# Patient Record
Sex: FEMALE | Race: WHITE | NOT HISPANIC OR LATINO
[De-identification: names, ages, dates, MRNs, and addresses within clinical notes are randomized per-mention and may not be internally consistent; named-entity substitution may affect disease eponyms.]

---

## 2018-02-12 ENCOUNTER — TRANSCRIPTION ENCOUNTER (OUTPATIENT)
Age: 33
End: 2018-02-12

## 2019-11-12 PROBLEM — Z00.00 ENCOUNTER FOR PREVENTIVE HEALTH EXAMINATION: Status: ACTIVE | Noted: 2019-11-12

## 2019-11-13 ENCOUNTER — APPOINTMENT (OUTPATIENT)
Dept: BREAST CENTER | Facility: CLINIC | Age: 34
End: 2019-11-13
Payer: COMMERCIAL

## 2019-11-13 VITALS
DIASTOLIC BLOOD PRESSURE: 67 MMHG | HEART RATE: 69 BPM | HEIGHT: 66 IN | BODY MASS INDEX: 21.38 KG/M2 | WEIGHT: 133 LBS | SYSTOLIC BLOOD PRESSURE: 122 MMHG

## 2019-11-13 DIAGNOSIS — Z82.3 FAMILY HISTORY OF STROKE: ICD-10-CM

## 2019-11-13 DIAGNOSIS — Z82.0 FAMILY HISTORY OF EPILEPSY AND OTHER DISEASES OF THE NERVOUS SYSTEM: ICD-10-CM

## 2019-11-13 DIAGNOSIS — Z78.9 OTHER SPECIFIED HEALTH STATUS: ICD-10-CM

## 2019-11-13 DIAGNOSIS — N63.20 UNSPECIFIED LUMP IN THE LEFT BREAST, UNSPECIFIED QUADRANT: ICD-10-CM

## 2019-11-13 DIAGNOSIS — Z82.49 FAMILY HISTORY OF ISCHEMIC HEART DISEASE AND OTHER DISEASES OF THE CIRCULATORY SYSTEM: ICD-10-CM

## 2019-11-13 PROCEDURE — 99244 OFF/OP CNSLTJ NEW/EST MOD 40: CPT

## 2019-11-24 NOTE — ASSESSMENT
[FreeTextEntry1] : plan bilateral mg/sono now\par plan MRI JAN 2020\par reviewed her risk status, she is high risk\par We reviewed risk reduction strategies including maintaining a BMI <25, limiting red meat intake and alcoholic beverages to 3 per week and exercise (150 min/ week low intensity or 75 min/week high intensity). And maintaining a normal vitamin D level.\par \par f/u with me 6 months\par She knows to call or return sooner should any concerns or questions arise.\par

## 2019-11-24 NOTE — REVIEW OF SYSTEMS
[Joint Swelling] : joint swelling [Joint Pain] : joint pain [Joint Stiffness] : joint stiffness [Negative] : Heme/Lymph [Feeling Tired] : feeling tired [de-identified] : headaches

## 2019-11-24 NOTE — PHYSICAL EXAM
[Atraumatic] : atraumatic [Normocephalic] : normocephalic [Supple] : supple [No Supraclavicular Adenopathy] : no supraclavicular adenopathy [Examined in the supine and seated position] : examined in the supine and seated position [No dominant masses] : no dominant masses in right breast  [No Nipple Retraction] : no left nipple retraction [No Nipple Discharge] : no left nipple discharge [No Axillary Lymphadenopathy] : no left axillary lymphadenopathy [No Edema] : no edema [No Rashes] : no rashes [No Ulceration] : no ulceration [de-identified] : LIQ medial there is a 2-3cm area of firm hardness c/w patient's area of concern

## 2019-11-24 NOTE — CONSULT LETTER
[Dear  ___] : Dear  [unfilled], [( Thank you for referring [unfilled] for consultation for _____ )] : Thank you for referring [unfilled] for consultation for [unfilled] [Please see my note below.] : Please see my note below. [Consult Closing:] : Thank you very much for allowing me to participate in the care of this patient.  If you have any questions, please do not hesitate to contact me. [Sincerely,] : Sincerely, [FreeTextEntry3] : Bozena Nava MS DO\par Breast Surgeon\par [facility] \[place] [address]\par  [DrNat  ___] : Dr. BREWSTER

## 2019-11-24 NOTE — HISTORY OF PRESENT ILLNESS
[FreeTextEntry1] : This is a 35 yo female referred by Dr. Williamson for family history of breast cancer. The patient's mother was diagnosed at age 29.  She has no previous breast history. \par She is overdue for imaging. She feels a left breast lump for the past few weeks. She finished nursing a few weeks ago as well.\par \par She does SBE. \par She has not noticed a change in her breast or a breast lump.\par She has not noticed a change in her nipple or nipple area.\par She has not noticed a change in the skin of the breast.\par She is not experiencing nipple discharge.\par She is not experiencing breast pain on left, on the right she had transient pain 1.5 months ago.\par She has not noticed a lump or lymph node under the armpit. \par \par BREAST CANCER RISK FACTORS\par Menarche: 14\par Date of LMP: 7/28/2018. 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                                 Images reviewed.\par Results: Birads 2\par No evidence of malignancy \par \par Last ultrasound:     01/25/2014                              Location: [facility] \par Report reviewed.                                 Images reviewed. \par Results: Birads 2\par No evidence of malignancy \par \par Last MRI:  06/11/2012                                           Location: [facility] \par Report reviewed.                                 Images reviewed. \par Results: Birads 1\par Negative

## 2020-04-07 ENCOUNTER — APPOINTMENT (OUTPATIENT)
Dept: BREAST CENTER | Facility: CLINIC | Age: 35
End: 2020-04-07
Payer: COMMERCIAL

## 2020-04-07 VITALS
DIASTOLIC BLOOD PRESSURE: 79 MMHG | HEIGHT: 66 IN | SYSTOLIC BLOOD PRESSURE: 126 MMHG | WEIGHT: 133 LBS | HEART RATE: 81 BPM | BODY MASS INDEX: 21.38 KG/M2

## 2020-04-07 DIAGNOSIS — Z12.31 ENCOUNTER FOR SCREENING MAMMOGRAM FOR MALIGNANT NEOPLASM OF BREAST: ICD-10-CM

## 2020-04-07 DIAGNOSIS — R92.2 INCONCLUSIVE MAMMOGRAM: ICD-10-CM

## 2020-04-07 PROCEDURE — 99215 OFFICE O/P EST HI 40 MIN: CPT

## 2020-04-07 NOTE — PHYSICAL EXAM
[Normocephalic] : normocephalic [Atraumatic] : atraumatic [Supple] : supple [No Supraclavicular Adenopathy] : no supraclavicular adenopathy [Examined in the supine and seated position] : examined in the supine and seated position [No Nipple Retraction] : no left nipple retraction [No Nipple Discharge] : no left nipple discharge [No Axillary Lymphadenopathy] : no left axillary lymphadenopathy [No Edema] : no edema [No Rashes] : no rashes [No Ulceration] : no ulceration [Symmetrical] : symmetrical [No dominant masses] : no dominant masses left breast [de-identified] : in area of patient's concern there is a faintly palpable mass in area of known complicated cyst.

## 2020-04-07 NOTE — ASSESSMENT
[FreeTextEntry1] : 34 yo female with high risk\par plan right us asap, if cyst then no mammogram\par plan bilateral mg/sono NOV 2020\par plan MRI MAR 2021\par reviewed her risk status, she is high risk\par We reviewed risk reduction strategies including maintaining a BMI <25, limiting red meat intake and alcoholic beverages to 3 per week and exercise (150 min/ week low intensity or 75 min/week high intensity). And maintaining a normal vitamin D level.\par \par f/u with me 6 months\par She knows to call or return sooner should any concerns or questions arise.\par  swelling, facial

## 2020-04-07 NOTE — CONSULT LETTER
[Dear  ___] : Dear  [unfilled], [Please see my note below.] : Please see my note below. [Consult Closing:] : Thank you very much for allowing me to participate in the care of this patient.  If you have any questions, please do not hesitate to contact me. [Sincerely,] : Sincerely, [DrNat  ___] : Dr. BREWSTER [Consult Letter:] : I had the pleasure of evaluating your patient, [unfilled]. [FreeTextEntry1] : We will get a right breast ultrasound asap. I expect the area to correlate with her known cyst. [FreeTextEntry3] : Bozena Nava MS DO\par Breast Surgeon\par [facility] \[place] [address]\par

## 2020-04-07 NOTE — REVIEW OF SYSTEMS
[Breast Lump] : breast lump [Negative] : Neurological General/healthful diet/Recommend to continue DASH diet; Defer fluid restriction to primary team.

## 2020-04-07 NOTE — HISTORY OF PRESENT ILLNESS
[FreeTextEntry1] : This is a 34 yo female referred by Dr. Williamson for family history of breast cancer. The patient's mother was diagnosed at age 29.  She has no previous breast history. \par She noticed a right breast mass since 4/4/2020.\par \par She does SBE. \par She has not noticed a change in her breast or a breast lump on left. On right mass, nontender, not enlarging\par She has not noticed a change in her nipple or nipple area.\par She has not noticed a change in the skin of the breast.\par She is not experiencing nipple discharge.\par She is not experiencing breast pain on left.\par She has not noticed a lump or lymph node under the armpit. \par \par BREAST CANCER RISK FACTORS\par Menarche: 14\par Date of LMP: 3/22/2020\par Menopause: pre \par Grav:  4    Para: 3 (4.5, 2.5, 11 months)\par Age at first live birth: 30\par Nursed: Yes all 3 children\par Hysterectomy: No \par Oophorectomy: No \par OCP: Yes in the past for 6 years \par HRT: No \par Last pap/pelvic exam: 6/2019 WNL \par Related family history: mother BCA @ 29\par Ashkenazi: No \par Mastery risk assessment: BRCAPRO 13.7%, TCv6 25.4%,  TCv7 31.1%, Juma 20.4%, Sariah is not applicable.\par BRCA testing: No (mother tested, negative) \par Bra size: 32B\par \par Last mammogram:   11/21/2019            Location: [place]\par Report reviewed.                                 Images reviewed.\par Results: Birads 3\par Extremely dense breast tissue with no mammographic evidence of malignancy and\par no interval change mammographically of an adverse nature. A follow-up\par mammogram is recommended in one year. \par \par Last ultrasound: 11/21/2019                   Location: [place] \par Report reviewed.                                 Images reviewed. \par Results: Birads 3\par At 8:00 in the right breast 2 to 4 cm from the nipple there is a probable\par complicated cyst. Six-month follow-up targeted right breast ultrasound is\par recommended to ensure stability or resolution.\par \par Last MRI:  1/9/2020                               Location: [place]  \par Report reviewed.                                 Images reviewed. \par Results: Birads 1\par No suspicious MRI findings. The patient is due for targeted\par ultrasound of the right breast for short interval follow-up of a probably\par benign finding in May 2020.

## 2020-04-14 ENCOUNTER — APPOINTMENT (OUTPATIENT)
Dept: BREAST CENTER | Facility: CLINIC | Age: 35
End: 2020-04-14
Payer: COMMERCIAL

## 2020-04-14 VITALS
HEART RATE: 66 BPM | HEIGHT: 66 IN | DIASTOLIC BLOOD PRESSURE: 75 MMHG | BODY MASS INDEX: 21.38 KG/M2 | WEIGHT: 133 LBS | SYSTOLIC BLOOD PRESSURE: 119 MMHG

## 2020-04-14 DIAGNOSIS — Z80.3 FAMILY HISTORY OF MALIGNANT NEOPLASM OF BREAST: ICD-10-CM

## 2020-04-14 DIAGNOSIS — Z78.9 OTHER SPECIFIED HEALTH STATUS: ICD-10-CM

## 2020-04-14 DIAGNOSIS — Z80.0 FAMILY HISTORY OF MALIGNANT NEOPLASM OF DIGESTIVE ORGANS: ICD-10-CM

## 2020-04-14 PROCEDURE — 99214 OFFICE O/P EST MOD 30 MIN: CPT

## 2020-04-14 NOTE — CONSULT LETTER
[Dear  ___] : Dear  [unfilled], [Consult Letter:] : I had the pleasure of evaluating your patient, [unfilled]. [Please see my note below.] : Please see my note below. [Consult Closing:] : Thank you very much for allowing me to participate in the care of this patient.  If you have any questions, please do not hesitate to contact me. [Sincerely,] : Sincerely, [DrNat  ___] : Dr. BREWSTER [FreeTextEntry1] : We will get a right breast ultrasound asap. I expect the area to correlate with her known cyst. [FreeTextEntry3] : Bozena Nava MS DO\par Breast Surgeon\par [facility] \[place] [address]\par

## 2020-04-14 NOTE — PHYSICAL EXAM
[Normocephalic] : normocephalic [Atraumatic] : atraumatic [Supple] : supple [No Supraclavicular Adenopathy] : no supraclavicular adenopathy [Examined in the supine and seated position] : examined in the supine and seated position [Symmetrical] : symmetrical [No dominant masses] : no dominant masses left breast [No Nipple Retraction] : no left nipple retraction [No Nipple Discharge] : no left nipple discharge [No Axillary Lymphadenopathy] : no left axillary lymphadenopathy [No Edema] : no edema [No Rashes] : no rashes [No Ulceration] : no ulceration [de-identified] : in area of patient's concern there is a faintly palpable mass in area of known complicated cyst but more lateral

## 2020-04-14 NOTE — ASSESSMENT
[FreeTextEntry1] : 34 yo female with high risk\par reviewed imaging from today-no findings\par plan observation and f/u 1 month\par plan bilateral mg/sono NOV 2020\par plan MRI MAR 2021\par reviewed her risk status, she is high risk\par We reviewed risk reduction strategies including maintaining a BMI <25, limiting red meat intake and alcoholic beverages to 3 per week and exercise (150 min/ week low intensity or 75 min/week high intensity). And maintaining a normal vitamin D level.\par \par f/u with me 1 month\par She knows to call or return sooner should any concerns or questions arise.\par

## 2020-05-12 ENCOUNTER — APPOINTMENT (OUTPATIENT)
Dept: BREAST CENTER | Facility: CLINIC | Age: 35
End: 2020-05-12
Payer: COMMERCIAL

## 2020-05-12 VITALS
SYSTOLIC BLOOD PRESSURE: 117 MMHG | HEART RATE: 66 BPM | HEIGHT: 66 IN | DIASTOLIC BLOOD PRESSURE: 75 MMHG | BODY MASS INDEX: 21.38 KG/M2 | WEIGHT: 133 LBS

## 2020-05-12 DIAGNOSIS — R92.8 OTHER ABNORMAL AND INCONCLUSIVE FINDINGS ON DIAGNOSTIC IMAGING OF BREAST: ICD-10-CM

## 2020-05-12 DIAGNOSIS — N63.10 UNSPECIFIED LUMP IN THE RIGHT BREAST, UNSPECIFIED QUADRANT: ICD-10-CM

## 2020-05-12 PROCEDURE — 99215 OFFICE O/P EST HI 40 MIN: CPT

## 2020-05-12 NOTE — ASSESSMENT
[FreeTextEntry1] : 34 yo female with high risk\par reviewed imaging from today-no findings\par discussed observation versus biopsy d/w dr. arias targeted sono today revealed FGC\par will get right breast MRI then discussed surgical excision for definitive management\par plan bilateral mg/sono NOV 2020\par plan bilateral MRI MAR 2021\par reviewed her risk status, she is high risk\par We reviewed risk reduction strategies including maintaining a BMI <25, limiting red meat intake and alcoholic beverages to 3 per week and exercise (150 min/ week low intensity or 75 min/week high intensity). And maintaining a normal vitamin D level.\par I will call her after mri to formulate appropriate plan\par She knows to call or return sooner should any concerns or questions arise.\par

## 2020-05-12 NOTE — PHYSICAL EXAM
[Normocephalic] : normocephalic [Atraumatic] : atraumatic [Supple] : supple [No Supraclavicular Adenopathy] : no supraclavicular adenopathy [Examined in the supine and seated position] : examined in the supine and seated position [Symmetrical] : symmetrical [No dominant masses] : no dominant masses left breast [No Nipple Retraction] : no left nipple retraction [No Nipple Discharge] : no left nipple discharge [No Axillary Lymphadenopathy] : no left axillary lymphadenopathy [No Edema] : no edema [No Ulceration] : no ulceration [No Rashes] : no rashes [de-identified] : in area of patient's concern there is a clearly palpable 1.5cm mass in area of patient's concern, which is more prominent

## 2020-05-12 NOTE — CONSULT LETTER
[Dear  ___] : Dear  [unfilled], [Please see my note below.] : Please see my note below. [Consult Letter:] : I had the pleasure of evaluating your patient, [unfilled]. [Consult Closing:] : Thank you very much for allowing me to participate in the care of this patient.  If you have any questions, please do not hesitate to contact me. [Sincerely,] : Sincerely, [DrNat  ___] : Dr. BREWSTER [FreeTextEntry1] : We will get a right breast ultrasound asap. I expect the area to correlate with her known cyst. [FreeTextEntry3] : Bozena Nava MS DO\par Breast Surgeon\par [facility] \[place] [address]\par

## 2020-05-12 NOTE — HISTORY OF PRESENT ILLNESS
[FreeTextEntry1] : This is a 36 yo female referred by Dr. Williamson for family history of breast cancer. The patient's mother was diagnosed at age 29.  She has no previous breast history. \par She noticed a right breast mass since 4/4/2020. She had negative imaging 4/14/2020 at .\par \par She does SBE. \par She has not noticed a change in her breast or a breast lump on left. On right mass, nontender, not enlarging\par She has not noticed a change in her nipple or nipple area.\par She has not noticed a change in the skin of the breast.\par She is not experiencing nipple discharge.\par She is not experiencing breast pain on left.\par She has not noticed a lump or lymph node under the armpit. \par \par BREAST CANCER RISK FACTORS\par Menarche: 14\par Date of LMP: 4/14/2020\par Menopause: pre \par Grav:  4    Para: 3 (4.5, 2.5, 11 months)\par Age at first live birth: 30\par Nursed: Yes all 3 children\par Hysterectomy: No \par Oophorectomy: No \par OCP: Yes in the past for 6 years \par HRT: No \par Last pap/pelvic exam: 6/2019 WNL \par Related family history: mother BCA @ 29\par Ashkenazi: No \par Mastery risk assessment: BRCAPRO 13.7%, TCv6 25.4%,  TCv7 31.1%, Juma 20.4%, Sariah is not applicable.\par BRCA testing: No (mother tested, negative) \par Bra size: 32B\par \par Last mammogram:   4/14/2020 RIGHT ONLY           Location: [place]\par Report reviewed.                                 Images reviewed on PACS\par Results: Birads 2\par No suspicious findings. The patient is due for her screening\par mammogram and ultrasound in November. Further evaluation of any region of\par persistent concern should be clinically based.\par \par Last ultrasound: 4/14/2020 (right)       Location:  \par Report reviewed.                                 Images reviewed. \par Results: Birads 3\par No suspicious ultrasound finding corresponding with the area of\par palpable concern. Stable probably benign mass in the right 8:00 axis\par 2 cm from the nipple.\par \par Last MRI:  1/9/2020                               Location: [place]  \par Report reviewed.                                 Images reviewed. \par Results: Birads 1\par No suspicious MRI findings. The patient is due for targeted\par ultrasound of the right breast for short interval follow-up of a probably\par benign finding in May 2020.

## 2020-06-11 ENCOUNTER — APPOINTMENT (OUTPATIENT)
Dept: BREAST CENTER | Facility: HOSPITAL | Age: 35
End: 2020-06-11

## 2020-06-19 ENCOUNTER — APPOINTMENT (OUTPATIENT)
Dept: BREAST CENTER | Facility: CLINIC | Age: 35
End: 2020-06-19

## 2020-06-24 ENCOUNTER — APPOINTMENT (OUTPATIENT)
Dept: BREAST CENTER | Facility: CLINIC | Age: 35
End: 2020-06-24

## 2024-02-01 ENCOUNTER — TRANSCRIPTION ENCOUNTER (OUTPATIENT)
Age: 39
End: 2024-02-01